# Patient Record
Sex: MALE | Race: WHITE | NOT HISPANIC OR LATINO | Employment: OTHER | URBAN - METROPOLITAN AREA
[De-identification: names, ages, dates, MRNs, and addresses within clinical notes are randomized per-mention and may not be internally consistent; named-entity substitution may affect disease eponyms.]

---

## 2017-05-19 ENCOUNTER — HOSPITAL ENCOUNTER (EMERGENCY)
Facility: HOSPITAL | Age: 42
Discharge: HOME | End: 2017-05-19
Attending: EMERGENCY MEDICINE | Admitting: EMERGENCY MEDICINE

## 2017-05-19 VITALS
RESPIRATION RATE: 18 BRPM | SYSTOLIC BLOOD PRESSURE: 142 MMHG | WEIGHT: 263 LBS | DIASTOLIC BLOOD PRESSURE: 94 MMHG | HEART RATE: 70 BPM | OXYGEN SATURATION: 97 % | TEMPERATURE: 97.8 F | BODY MASS INDEX: 35.62 KG/M2 | HEIGHT: 72 IN

## 2017-05-19 DIAGNOSIS — I10 ESSENTIAL HYPERTENSION, BENIGN: ICD-10-CM

## 2017-05-19 DIAGNOSIS — E11.9 TYPE 2 DIABETES MELLITUS WITHOUT COMPLICATION, WITHOUT LONG-TERM CURRENT USE OF INSULIN (HCC): Primary | ICD-10-CM

## 2017-05-19 LAB
ALBUMIN SERPL-MCNC: 4.7 G/DL (ref 3.2–4.8)
ALBUMIN/GLOB SERPL: 1.6 G/DL (ref 1.5–2.5)
ALP SERPL-CCNC: 80 U/L (ref 25–100)
ALT SERPL W P-5'-P-CCNC: 35 U/L (ref 7–40)
ANION GAP SERPL CALCULATED.3IONS-SCNC: 7 MMOL/L (ref 3–11)
AST SERPL-CCNC: 23 U/L (ref 0–33)
B-OH-BUTYR SERPL-SCNC: 0.65 MMOL/L
BASOPHILS # BLD AUTO: 0.05 10*3/MM3 (ref 0–0.2)
BASOPHILS NFR BLD AUTO: 0.5 % (ref 0–1)
BILIRUB SERPL-MCNC: 0.8 MG/DL (ref 0.3–1.2)
BUN BLD-MCNC: 11 MG/DL (ref 9–23)
BUN/CREAT SERPL: 15.7 (ref 7–25)
CALCIUM SPEC-SCNC: 11 MG/DL (ref 8.7–10.4)
CHLORIDE SERPL-SCNC: 99 MMOL/L (ref 99–109)
CO2 SERPL-SCNC: 30 MMOL/L (ref 20–31)
CREAT BLD-MCNC: 0.7 MG/DL (ref 0.6–1.3)
DEPRECATED RDW RBC AUTO: 40 FL (ref 37–54)
EOSINOPHIL # BLD AUTO: 0.21 10*3/MM3 (ref 0.1–0.3)
EOSINOPHIL NFR BLD AUTO: 2.1 % (ref 0–3)
ERYTHROCYTE [DISTWIDTH] IN BLOOD BY AUTOMATED COUNT: 12.5 % (ref 11.3–14.5)
GFR SERPL CREATININE-BSD FRML MDRD: 124 ML/MIN/1.73
GLOBULIN UR ELPH-MCNC: 2.9 GM/DL
GLUCOSE BLD-MCNC: 282 MG/DL (ref 70–100)
GLUCOSE BLDC GLUCOMTR-MCNC: 256 MG/DL (ref 70–130)
HBA1C MFR BLD: 10.8 % (ref 4.8–5.6)
HCT VFR BLD AUTO: 49 % (ref 38.9–50.9)
HGB BLD-MCNC: 16.5 G/DL (ref 13.1–17.5)
HOLD SPECIMEN: NORMAL
HOLD SPECIMEN: NORMAL
IMM GRANULOCYTES # BLD: 0.03 10*3/MM3 (ref 0–0.03)
IMM GRANULOCYTES NFR BLD: 0.3 % (ref 0–0.6)
LYMPHOCYTES # BLD AUTO: 4.11 10*3/MM3 (ref 0.6–4.8)
LYMPHOCYTES NFR BLD AUTO: 41.5 % (ref 24–44)
MCH RBC QN AUTO: 29.2 PG (ref 27–31)
MCHC RBC AUTO-ENTMCNC: 33.7 G/DL (ref 32–36)
MCV RBC AUTO: 86.6 FL (ref 80–99)
MONOCYTES # BLD AUTO: 0.77 10*3/MM3 (ref 0–1)
MONOCYTES NFR BLD AUTO: 7.8 % (ref 0–12)
NEUTROPHILS # BLD AUTO: 4.73 10*3/MM3 (ref 1.5–8.3)
NEUTROPHILS NFR BLD AUTO: 47.8 % (ref 41–71)
PLATELET # BLD AUTO: 258 10*3/MM3 (ref 150–450)
PMV BLD AUTO: 8.9 FL (ref 6–12)
POTASSIUM BLD-SCNC: 3.7 MMOL/L (ref 3.5–5.5)
PROT SERPL-MCNC: 7.6 G/DL (ref 5.7–8.2)
RBC # BLD AUTO: 5.66 10*6/MM3 (ref 4.2–5.76)
SODIUM BLD-SCNC: 136 MMOL/L (ref 132–146)
WBC NRBC COR # BLD: 9.9 10*3/MM3 (ref 3.5–10.8)
WHOLE BLOOD HOLD SPECIMEN: NORMAL
WHOLE BLOOD HOLD SPECIMEN: NORMAL

## 2017-05-19 PROCEDURE — 83036 HEMOGLOBIN GLYCOSYLATED A1C: CPT | Performed by: EMERGENCY MEDICINE

## 2017-05-19 PROCEDURE — 82010 KETONE BODYS QUAN: CPT | Performed by: EMERGENCY MEDICINE

## 2017-05-19 PROCEDURE — 80053 COMPREHEN METABOLIC PANEL: CPT | Performed by: EMERGENCY MEDICINE

## 2017-05-19 PROCEDURE — 99283 EMERGENCY DEPT VISIT LOW MDM: CPT

## 2017-05-19 PROCEDURE — 85025 COMPLETE CBC W/AUTO DIFF WBC: CPT | Performed by: EMERGENCY MEDICINE

## 2017-05-19 PROCEDURE — 82962 GLUCOSE BLOOD TEST: CPT

## 2017-05-19 RX ORDER — SODIUM CHLORIDE 0.9 % (FLUSH) 0.9 %
10 SYRINGE (ML) INJECTION AS NEEDED
Status: DISCONTINUED | OUTPATIENT
Start: 2017-05-19 | End: 2017-05-19 | Stop reason: HOSPADM

## 2017-05-22 ENCOUNTER — OFFICE VISIT (OUTPATIENT)
Dept: FAMILY MEDICINE CLINIC | Facility: CLINIC | Age: 42
End: 2017-05-22

## 2017-05-22 VITALS
TEMPERATURE: 97.9 F | OXYGEN SATURATION: 98 % | SYSTOLIC BLOOD PRESSURE: 132 MMHG | HEART RATE: 80 BPM | HEIGHT: 72 IN | DIASTOLIC BLOOD PRESSURE: 86 MMHG | BODY MASS INDEX: 35.62 KG/M2 | WEIGHT: 263 LBS

## 2017-05-22 DIAGNOSIS — E11.9 TYPE 2 DIABETES MELLITUS WITHOUT COMPLICATION, WITHOUT LONG-TERM CURRENT USE OF INSULIN (HCC): Primary | ICD-10-CM

## 2017-05-22 DIAGNOSIS — J40 BRONCHITIS: ICD-10-CM

## 2017-05-22 LAB — GLUCOSE BLDC GLUCOMTR-MCNC: 294 MG/DL (ref 70–130)

## 2017-05-22 PROCEDURE — 82962 GLUCOSE BLOOD TEST: CPT | Performed by: PHYSICIAN ASSISTANT

## 2017-05-22 PROCEDURE — 99214 OFFICE O/P EST MOD 30 MIN: CPT | Performed by: PHYSICIAN ASSISTANT

## 2017-06-07 ENCOUNTER — OFFICE VISIT (OUTPATIENT)
Dept: FAMILY MEDICINE CLINIC | Facility: CLINIC | Age: 42
End: 2017-06-07

## 2017-06-07 VITALS
OXYGEN SATURATION: 96 % | BODY MASS INDEX: 36.16 KG/M2 | HEART RATE: 76 BPM | WEIGHT: 267 LBS | HEIGHT: 72 IN | DIASTOLIC BLOOD PRESSURE: 76 MMHG | SYSTOLIC BLOOD PRESSURE: 124 MMHG | RESPIRATION RATE: 16 BRPM

## 2017-06-07 DIAGNOSIS — E11.9 TYPE 2 DIABETES MELLITUS WITHOUT COMPLICATION, WITHOUT LONG-TERM CURRENT USE OF INSULIN (HCC): Primary | ICD-10-CM

## 2017-06-07 LAB — GLUCOSE BLDC GLUCOMTR-MCNC: 280 MG/DL (ref 70–130)

## 2017-06-07 PROCEDURE — 82962 GLUCOSE BLOOD TEST: CPT | Performed by: PHYSICIAN ASSISTANT

## 2017-06-07 PROCEDURE — 99213 OFFICE O/P EST LOW 20 MIN: CPT | Performed by: PHYSICIAN ASSISTANT

## 2017-06-07 RX ORDER — BLOOD-GLUCOSE METER
EACH MISCELLANEOUS
Refills: 0 | COMMUNITY
Start: 2017-05-22

## 2017-06-07 RX ORDER — BLOOD SUGAR DIAGNOSTIC
STRIP MISCELLANEOUS
Refills: 0 | COMMUNITY
Start: 2017-05-22

## 2017-06-07 RX ORDER — GLIMEPIRIDE 2 MG/1
2 TABLET ORAL
Qty: 30 TABLET | Refills: 2 | Status: SHIPPED | OUTPATIENT
Start: 2017-06-07 | End: 2017-09-14 | Stop reason: SDUPTHER

## 2017-06-07 RX ORDER — LANCETS
EACH MISCELLANEOUS
Refills: 0 | COMMUNITY
Start: 2017-05-22

## 2017-06-08 NOTE — PROGRESS NOTES
Subjective   Cecilio Abdi is a 41 y.o. male    History of Present Illness  Patient is a pleasant 41-year-old white male comes in for follow-up of type 2 diabetes, on his last visit he was started on metformin and diet.  Patient states he does feel better his blood sugars went down a little bit but still blood sugars running over 200 postprandial, he still has some fatigue no energy blurry vision.  He has been trying to change his diet he has gained 3 pounds however.  Patient having some no source breath no chest pain, he has been trying to exercise at least to 3 times per week      The following portions of the patient's history were reviewed and updated as appropriate: allergies, current medications, past social history and problem list    Review of Systems   Constitutional: Negative for appetite change, diaphoresis, fatigue and unexpected weight change.   Eyes: Negative for visual disturbance.   Respiratory: Negative for chest tightness and shortness of breath.    Cardiovascular: Negative for chest pain, palpitations and leg swelling.   Gastrointestinal: Negative for diarrhea, nausea and vomiting.   Endocrine: Negative for polydipsia, polyphagia and polyuria.   Skin: Negative for color change.   Neurological: Negative for dizziness, weakness, light-headedness and numbness.       Objective     Vitals:    06/07/17 0822   BP: 124/76   Pulse: 76   Resp: 16   SpO2: 96%       Physical Exam   Constitutional: He appears well-developed and well-nourished.   Neck: Neck supple. No JVD present. No thyromegaly present.   Cardiovascular: Normal rate, regular rhythm, normal heart sounds and intact distal pulses.    Pulmonary/Chest: Effort normal and breath sounds normal.   Abdominal: Soft. Bowel sounds are normal.   Musculoskeletal: He exhibits no edema.   Lymphadenopathy:     He has no cervical adenopathy.   Neurological: No sensory deficit.   Skin: Skin is warm and dry. He is not diaphoretic.   Nursing note and vitals  reviewed.      Assessment/Plan     Diagnoses and all orders for this visit:    Type 2 diabetes mellitus without complication, without long-term current use of insulin  -     POCT Glucose  -     glimepiride (AMARYL) 2 MG tablet; Take 1 tablet by mouth Every Morning Before Breakfast.    Other orders  -     Blood Glucose Monitoring Suppl (ACCU-CHEK VERO PLUS) W/DEVICE kit; USE TO CHECK BLOOD SUGAR ONCE D  -     ACCU-CHEK VERO PLUS test strip; CHECK BLOOD SUGAR ONCE D  -     ACCU-CHEK SOFTCLIX LANCETS lancets; USE TO CHECK BLOOD SUGAR QD    #1 add Amaryl 2 mg 1 by mouth everyday dispense 30    Follow-up in 4 weeks, lower diet and exercise he was encouraged to check blood sugar fasting and 2 hour postprandial.  We discussed diet at length along with exercise.  Stressed weight loss, approximately 10% of body weight

## 2017-06-20 ENCOUNTER — TELEPHONE (OUTPATIENT)
Dept: FAMILY MEDICINE CLINIC | Facility: CLINIC | Age: 42
End: 2017-06-20

## 2017-06-20 NOTE — TELEPHONE ENCOUNTER
----- Message from Madeline Nj sent at 6/20/2017  9:21 AM EDT -----  Contact: PATIENT  HE NEEDS metFORMIN (GLUCOPHAGE) 500 MG tablet 60 tablet7       Sig - Route: Take 1 tablet by mouth 2 (Two) Times a Day With Meals. - Oral   PLEASE CALL INTO :      Pharmacy     [facility] [id number] [facility] [address] AT [facility] [address] [facility] [phone] [facility] [phone] FX       LAST VISIT 6/7/17     BLANCA

## 2017-09-14 DIAGNOSIS — E11.9 TYPE 2 DIABETES MELLITUS WITHOUT COMPLICATION, WITHOUT LONG-TERM CURRENT USE OF INSULIN (HCC): ICD-10-CM

## 2017-09-14 RX ORDER — GLIMEPIRIDE 2 MG/1
TABLET ORAL
Qty: 30 TABLET | Refills: 6 | Status: SHIPPED | OUTPATIENT
Start: 2017-09-14 | End: 2018-04-09 | Stop reason: SDUPTHER

## 2017-11-01 ENCOUNTER — OFFICE VISIT (OUTPATIENT)
Dept: FAMILY MEDICINE CLINIC | Facility: CLINIC | Age: 42
End: 2017-11-01

## 2017-11-01 VITALS
SYSTOLIC BLOOD PRESSURE: 132 MMHG | RESPIRATION RATE: 16 BRPM | HEART RATE: 79 BPM | OXYGEN SATURATION: 97 % | WEIGHT: 275.8 LBS | DIASTOLIC BLOOD PRESSURE: 80 MMHG | HEIGHT: 72 IN | BODY MASS INDEX: 37.36 KG/M2

## 2017-11-01 DIAGNOSIS — E11.9 TYPE 2 DIABETES MELLITUS WITHOUT COMPLICATION, WITHOUT LONG-TERM CURRENT USE OF INSULIN (HCC): Primary | ICD-10-CM

## 2017-11-01 LAB
GLUCOSE BLDC GLUCOMTR-MCNC: 209 MG/DL (ref 70–130)
HBA1C MFR BLD: 7.1 %

## 2017-11-01 PROCEDURE — 83036 HEMOGLOBIN GLYCOSYLATED A1C: CPT | Performed by: PHYSICIAN ASSISTANT

## 2017-11-01 PROCEDURE — 99213 OFFICE O/P EST LOW 20 MIN: CPT | Performed by: PHYSICIAN ASSISTANT

## 2017-11-01 PROCEDURE — 82962 GLUCOSE BLOOD TEST: CPT | Performed by: PHYSICIAN ASSISTANT

## 2017-11-01 NOTE — PROGRESS NOTES
Subjective   Cecilio Abdi is a 42 y.o. male    History of Present Illness  Patient's a 42-year-old white male who comes in for for type 2 diabetes patient's A1c today was 7.1 which is improved but he has gained over 12 pounds he states he feels tired fatigue with multiple diabetic symptoms.  Patient feels tired no energy states he has been following a poor diet no improvement with his diet since last visit.      The following portions of the patient's history were reviewed and updated as appropriate: allergies, current medications, past social history and problem list    Review of Systems   Constitutional: Positive for fatigue and unexpected weight change. Negative for appetite change and diaphoresis.   Eyes: Negative for visual disturbance.   Respiratory: Negative for cough, chest tightness and shortness of breath.    Cardiovascular: Negative for chest pain, palpitations and leg swelling.   Gastrointestinal: Negative for diarrhea, nausea and vomiting.   Endocrine: Negative for polydipsia, polyphagia and polyuria.   Skin: Negative for color change and rash.   Neurological: Negative for dizziness, syncope, weakness, light-headedness, numbness and headaches.       Objective     Vitals:    11/01/17 0933   BP: 132/80   Pulse: 79   Resp: 16   SpO2: 97%       Physical Exam   Constitutional: He appears well-developed and well-nourished.   Neck: Neck supple. No JVD present. No thyromegaly present.   Cardiovascular: Normal rate, regular rhythm, normal heart sounds, intact distal pulses and normal pulses.    No murmur heard.  Pulmonary/Chest: Effort normal and breath sounds normal. No respiratory distress.   Abdominal: Soft. Bowel sounds are normal. There is no hepatosplenomegaly. There is no tenderness.   Musculoskeletal: He exhibits no edema.   Lymphadenopathy:     He has no cervical adenopathy.   Neurological: No sensory deficit.   Skin: Skin is warm and dry. He is not diaphoretic.   Nursing note and vitals  reviewed.      Assessment/Plan     Diagnoses and all orders for this visit:    Type 2 diabetes mellitus without complication, without long-term current use of insulin  -     POCT Glucose  -     POC Glycosylated Hemoglobin (Hb A1C)  -     MicroAlbumin, Urine, Random - Urine, Clean Catch  -     Lipid Panel  -     Basic Metabolic Panel    Follow-up in 3 months, discussed with patient at length diabetes management he was encouraged to lose weight his A1c is better but he has gained over 12 pounds on encouraged patient to lose weight.  We discussed 20 pound weight loss over the next 3-6 months he is to follow-up in 6 weeks to recheck weight.

## 2018-04-09 ENCOUNTER — LAB REQUISITION (OUTPATIENT)
Dept: LAB | Facility: HOSPITAL | Age: 43
End: 2018-04-09

## 2018-04-09 ENCOUNTER — OFFICE VISIT (OUTPATIENT)
Dept: FAMILY MEDICINE CLINIC | Facility: CLINIC | Age: 43
End: 2018-04-09

## 2018-04-09 VITALS
HEART RATE: 78 BPM | OXYGEN SATURATION: 95 % | WEIGHT: 293 LBS | RESPIRATION RATE: 16 BRPM | DIASTOLIC BLOOD PRESSURE: 82 MMHG | SYSTOLIC BLOOD PRESSURE: 126 MMHG | BODY MASS INDEX: 39.68 KG/M2 | HEIGHT: 72 IN

## 2018-04-09 DIAGNOSIS — IMO0001 UNCONTROLLED TYPE 2 DIABETES MELLITUS WITHOUT COMPLICATION, WITHOUT LONG-TERM CURRENT USE OF INSULIN: Primary | ICD-10-CM

## 2018-04-09 DIAGNOSIS — E11.9 TYPE 2 DIABETES MELLITUS WITHOUT COMPLICATION, WITHOUT LONG-TERM CURRENT USE OF INSULIN (HCC): ICD-10-CM

## 2018-04-09 DIAGNOSIS — Z00.00 ROUTINE GENERAL MEDICAL EXAMINATION AT A HEALTH CARE FACILITY: ICD-10-CM

## 2018-04-09 LAB
ALBUMIN SERPL-MCNC: 4.5 G/DL (ref 3.2–4.8)
ALBUMIN/GLOB SERPL: 2 G/DL (ref 1.5–2.5)
ALP SERPL-CCNC: 71 U/L (ref 25–100)
ALT SERPL-CCNC: 27 U/L (ref 7–40)
AST SERPL-CCNC: 17 U/L (ref 0–33)
BILIRUB SERPL-MCNC: 0.3 MG/DL (ref 0.3–1.2)
BUN SERPL-MCNC: 13 MG/DL (ref 9–23)
BUN/CREAT SERPL: 16.3 (ref 7–25)
CALCIUM SERPL-MCNC: 9.3 MG/DL (ref 8.7–10.4)
CHLORIDE SERPL-SCNC: 99 MMOL/L (ref 99–109)
CHOLEST SERPL-MCNC: 177 MG/DL (ref 0–200)
CO2 SERPL-SCNC: 28 MMOL/L (ref 20–31)
CREAT SERPL-MCNC: 0.8 MG/DL (ref 0.6–1.3)
GFR SERPLBLD CREATININE-BSD FMLA CKD-EPI: 106 ML/MIN/1.73
GFR SERPLBLD CREATININE-BSD FMLA CKD-EPI: 128 ML/MIN/1.73
GLOBULIN SER CALC-MCNC: 2.3 GM/DL
GLUCOSE SERPL-MCNC: 266 MG/DL (ref 70–100)
HBA1C MFR BLD: 8.6 %
HDLC SERPL-MCNC: 32 MG/DL (ref 40–60)
LDLC SERPL CALC-MCNC: 111 MG/DL (ref 0–100)
POTASSIUM SERPL-SCNC: 4.4 MMOL/L (ref 3.5–5.5)
PROT SERPL-MCNC: 6.8 G/DL (ref 5.7–8.2)
SODIUM SERPL-SCNC: 137 MMOL/L (ref 132–146)
TRIGL SERPL-MCNC: 168 MG/DL (ref 0–150)
VLDLC SERPL CALC-MCNC: 33.6 MG/DL

## 2018-04-09 PROCEDURE — 83036 HEMOGLOBIN GLYCOSYLATED A1C: CPT | Performed by: PHYSICIAN ASSISTANT

## 2018-04-09 PROCEDURE — 99213 OFFICE O/P EST LOW 20 MIN: CPT | Performed by: PHYSICIAN ASSISTANT

## 2018-04-09 PROCEDURE — 36415 COLL VENOUS BLD VENIPUNCTURE: CPT | Performed by: PHYSICIAN ASSISTANT

## 2018-04-09 RX ORDER — GLIMEPIRIDE 2 MG/1
TABLET ORAL
Qty: 30 TABLET | Refills: 5 | Status: SHIPPED | OUTPATIENT
Start: 2018-04-09 | End: 2018-10-12

## 2018-04-09 NOTE — PROGRESS NOTES
Subjective   Cecilio Abdi is a 42 y.o. male  Diabetes (FBS running 200-220 at home. Would like endo referral)      History of Present Illness  Patient is a 42-year-old white male comes in follow-up type 2 diabetes, he's gained 30 pounds since last year, his A1c went from 7 to 8.2.  Blood sugar is not better, he comes in for diabetes follow-up he's having no peripheral neuropathies he does feel tired however.  He's trying to watch his diet but had little success.      The following portions of the patient's history were reviewed and updated as appropriate: allergies, current medications, past social history and problem list    Review of Systems   Constitutional: Negative for appetite change, diaphoresis, fatigue and unexpected weight change.   Eyes: Negative for visual disturbance.   Respiratory: Negative for cough, chest tightness and shortness of breath.    Cardiovascular: Negative for chest pain, palpitations and leg swelling.   Gastrointestinal: Negative for diarrhea, nausea and vomiting.   Endocrine: Negative for polydipsia, polyphagia and polyuria.   Skin: Negative for color change and rash.   Neurological: Negative for dizziness, syncope, weakness, light-headedness, numbness and headaches.       Objective     Vitals:    04/09/18 0853   BP: 126/82   Pulse: 78   Resp: 16   SpO2: 95%       Physical Exam   Constitutional: He appears well-developed and well-nourished.   Neck: Neck supple. No JVD present. No thyromegaly present.   Cardiovascular: Normal rate, regular rhythm, normal heart sounds, intact distal pulses and normal pulses.    No murmur heard.  Pulmonary/Chest: Effort normal and breath sounds normal. No respiratory distress.   Abdominal: Soft. Bowel sounds are normal. There is no hepatosplenomegaly. There is no tenderness.   Musculoskeletal: He exhibits no edema.   Lymphadenopathy:     He has no cervical adenopathy.   Neurological: No sensory deficit.   Skin: Skin is warm and dry. He is not diaphoretic.    Nursing note and vitals reviewed.      Assessment/Plan     Diagnoses and all orders for this visit:    Type 2 diabetes mellitus without complication, without long-term current use of insulin  -     POC Glycosylated Hemoglobin (Hb A1C)  -     Referral for Diabetic Eye Exam (Ophthalmology)  -     MicroAlbumin, Urine, Random - Urine, Clean Catch  -     Lipid Panel  -     Comprehensive Metabolic Panel    #1 check labs    refer to ophthalmology for eye exam    #2 vomiting patient about weight loss for important he lose at least 10% of his body weight which is approximately 30 pounds.  We discussed low-calorie low-carb diet, 2 L of water per day walking at least 3-5 times per week along with exercising for 30 minutes

## 2018-04-10 LAB — MICROALBUMIN UR-MCNC: 17.8 UG/ML

## 2018-10-12 ENCOUNTER — OFFICE VISIT (OUTPATIENT)
Dept: FAMILY MEDICINE CLINIC | Facility: CLINIC | Age: 43
End: 2018-10-12

## 2018-10-12 VITALS
BODY MASS INDEX: 37.38 KG/M2 | SYSTOLIC BLOOD PRESSURE: 132 MMHG | HEART RATE: 75 BPM | HEIGHT: 72 IN | OXYGEN SATURATION: 99 % | DIASTOLIC BLOOD PRESSURE: 70 MMHG | WEIGHT: 276 LBS | TEMPERATURE: 99 F

## 2018-10-12 DIAGNOSIS — M54.41 ACUTE LEFT-SIDED LOW BACK PAIN WITH BILATERAL SCIATICA: Primary | ICD-10-CM

## 2018-10-12 DIAGNOSIS — E11.9 TYPE 2 DIABETES MELLITUS WITHOUT COMPLICATION, WITHOUT LONG-TERM CURRENT USE OF INSULIN (HCC): ICD-10-CM

## 2018-10-12 DIAGNOSIS — M54.42 ACUTE LEFT-SIDED LOW BACK PAIN WITH BILATERAL SCIATICA: Primary | ICD-10-CM

## 2018-10-12 DIAGNOSIS — M51.36 DEGENERATIVE DISC DISEASE, LUMBAR: ICD-10-CM

## 2018-10-12 PROCEDURE — 99214 OFFICE O/P EST MOD 30 MIN: CPT | Performed by: PHYSICIAN ASSISTANT

## 2018-10-12 RX ORDER — METHOCARBAMOL 750 MG/1
TABLET, FILM COATED ORAL
Qty: 80 TABLET | Refills: 1 | Status: SHIPPED | OUTPATIENT
Start: 2018-10-12

## 2018-10-12 RX ORDER — METHYLPREDNISOLONE 4 MG/1
TABLET ORAL
Qty: 1 EACH | Refills: 0 | Status: SHIPPED | OUTPATIENT
Start: 2018-10-12

## 2018-10-12 RX ORDER — EMPAGLIFLOZIN 25 MG/1
TABLET, FILM COATED ORAL DAILY
Refills: 2 | COMMUNITY
Start: 2018-09-20

## 2018-10-12 RX ORDER — LIRAGLUTIDE 6 MG/ML
INJECTION SUBCUTANEOUS
Refills: 5 | COMMUNITY
Start: 2018-10-03

## 2018-10-12 NOTE — PROGRESS NOTES
Subjective   Cecilio Abdi is a 43 y.o. male  Back Pain (low back pain, difficult to ambulate ) and Groin Pain (left sided groin pain )      History of Present Illness  Patient comes in day for evaluation of acute worsening low back pain.  He states that he has a history of chronic back pain associated with degenerative disc disease and saw Dr. Vasquez a couple of years ago is also seen Dr. Theodore.  He states he did injections a couple of times but had to discontinue them because of severe elevation in his blood sugar at the time.  He states in general he has minimal pain on most days and typically will have a flareup of severe pain about once a year.  He states that they've taken a shower and started having worsening pain in his left lower back to his left leg.  He reports he has a history of chronic numbness which is stable and his right leg for the past 17 years.  No difficulty urinating.  He has pain with flexion of his hips.  He states the pain is 8 out of 10, keeping him up at night and he is unable to walk or bend.  Blood sugar is stable he states was 142 when he checked it 4 days ago and he is on his diabetic medication.  The following portions of the patient's history were reviewed and updated as appropriate: allergies, current medications, past social history and problem list    Review of Systems   Constitutional: Negative.    Respiratory: Negative.    Gastrointestinal: Negative.    Genitourinary: Negative.  Negative for penile pain, penile swelling, scrotal swelling and testicular pain.   Musculoskeletal: Positive for back pain, gait problem and myalgias. Negative for arthralgias.        Patient is experiencing back pain radiating into his groin and legs   Neurological: Negative for dizziness, tremors, weakness and numbness.       Objective     Vitals:    10/12/18 0832   BP: 132/70   Pulse: 75   Temp: 99 °F (37.2 °C)   SpO2: 99%       Physical Exam   Constitutional: He is oriented to person, place, and time.  He appears well-developed and well-nourished.  Non-toxic appearance. He does not have a sickly appearance. He does not appear ill. He appears distressed.   Obesity noted     Neck: No thyromegaly present.   Cardiovascular: Normal rate and regular rhythm.    Pulmonary/Chest: Effort normal and breath sounds normal.   Abdominal: Soft. Bowel sounds are normal. There is no tenderness.   Musculoskeletal:        Lumbar back: He exhibits decreased range of motion, tenderness, bony tenderness and pain. He exhibits no swelling, no deformity and no spasm.   Patient appears uncomfortable, stiff, unable to climb  upon exam table, unable to flex spine or hips without pain   Neurological: He is alert and oriented to person, place, and time. He has normal reflexes.   Skin: He is not diaphoretic.   Psychiatric: He has a normal mood and affect. His behavior is normal. Judgment and thought content normal.   Nursing note and vitals reviewed.      Assessment/Plan     Diagnoses and all orders for this visit:    Acute left-sided low back pain with bilateral sciatica  -     MethylPREDNISolone (MEDROL, SHERRI,) 4 MG tablet; Take as directed on package instructions.  -     methocarbamol (ROBAXIN) 750 MG tablet; Take 2 every 6hours for back pain    Type 2 diabetes mellitus without complication, without long-term current use of insulin (CMS/[facility])    Degenerative disc disease, lumbar    Other orders  -     JARDIANCE 25 MG tablet; Take  by mouth Daily.  -     VICTOZA 18 MG/3ML solution pen-injector injection; ADM 1.8 MG SC QD  -     NOVOFINE 32G X 6 MM misc; USE ONCE D    Warned patient that blood sugar would elevate on Medrol Dosepak, if symptoms persist would recommend x-ray and/or MRI of lumbar spine.  Would also recommend possibly referral to physical therapy.  Prescription given for Norco 10/325 one twice a day as needed for severe pain #14 with no refills, discussed abuse potential this medication, discussed short-term usage plan and advised  patient not to drive on medication.

## 2025-03-06 ENCOUNTER — TRANSCRIBE ORDERS (OUTPATIENT)
Dept: ADMINISTRATIVE | Facility: HOSPITAL | Age: 50
End: 2025-03-06
Payer: COMMERCIAL

## 2025-03-06 DIAGNOSIS — K80.20 GALLSTONES: Primary | ICD-10-CM

## 2025-03-31 ENCOUNTER — HOSPITAL ENCOUNTER (OUTPATIENT)
Dept: ULTRASOUND IMAGING | Facility: HOSPITAL | Age: 50
Discharge: HOME | End: 2025-03-31
Admitting: PHYSICIAN ASSISTANT
Payer: COMMERCIAL

## 2025-03-31 DIAGNOSIS — K80.20 GALLSTONES: ICD-10-CM

## 2025-03-31 PROCEDURE — 76705 ECHO EXAM OF ABDOMEN: CPT
